# Patient Record
Sex: MALE | Race: WHITE | URBAN - METROPOLITAN AREA
[De-identification: names, ages, dates, MRNs, and addresses within clinical notes are randomized per-mention and may not be internally consistent; named-entity substitution may affect disease eponyms.]

---

## 2017-12-12 ENCOUNTER — APPOINTMENT (OUTPATIENT)
Dept: URBAN - METROPOLITAN AREA CLINIC 292 | Age: 22
Setting detail: DERMATOLOGY
End: 2017-12-12

## 2017-12-12 DIAGNOSIS — L70.0 ACNE VULGARIS: ICD-10-CM

## 2017-12-12 DIAGNOSIS — L81.0 POSTINFLAMMATORY HYPERPIGMENTATION: ICD-10-CM

## 2017-12-12 PROCEDURE — OTHER OTHER: OTHER

## 2017-12-12 PROCEDURE — OTHER PRESCRIPTION: OTHER

## 2017-12-12 PROCEDURE — 99202 OFFICE O/P NEW SF 15 MIN: CPT | Mod: 25

## 2017-12-12 PROCEDURE — OTHER ACNE SURGERY (MULTIPLE LESIONS): OTHER

## 2017-12-12 PROCEDURE — OTHER CHEMICAL PEEL GLYCOLIC ACID: OTHER

## 2017-12-12 PROCEDURE — 10040 ACNE SURGERY: CPT

## 2017-12-12 PROCEDURE — OTHER COUNSELING: OTHER

## 2017-12-12 RX ORDER — BENZOYL PEROXIDE 5 G/100G
GEL TOPICAL
Qty: 1 | Refills: 5 | Status: ERX | COMMUNITY
Start: 2017-12-12

## 2017-12-12 RX ORDER — MINOCYCLINE HYDROCHLORIDE 100 MG/1
CAPSULE ORAL
Qty: 60 | Refills: 5 | Status: ERX | COMMUNITY
Start: 2017-12-12

## 2017-12-12 ASSESSMENT — LOCATION DETAILED DESCRIPTION DERM
LOCATION DETAILED: LEFT SUPERIOR MEDIAL FOREHEAD
LOCATION DETAILED: LEFT CENTRAL MALAR CHEEK
LOCATION DETAILED: LEFT MEDIAL FOREHEAD

## 2017-12-12 ASSESSMENT — LOCATION SIMPLE DESCRIPTION DERM
LOCATION SIMPLE: LEFT FOREHEAD
LOCATION SIMPLE: LEFT CHEEK

## 2017-12-12 ASSESSMENT — LOCATION ZONE DERM: LOCATION ZONE: FACE

## 2017-12-12 NOTE — PROCEDURE: OTHER
Other (Free Text): Differin Qhs
Note Text (......Xxx Chief Complaint.): This diagnosis correlates with the
Detail Level: Zone

## 2017-12-12 NOTE — PROCEDURE: CHEMICAL PEEL GLYCOLIC ACID
Glycolic Acid %: 30%
Consent: Prior to the procedure, written consent was obtained and risks were reviewed, including but not limited to: redness, peeling, blistering, pigmentary change, scarring, infection, and pain.
Time (Mins): 1
Prep: The treated area was degreased with pre-peel cleanser, and vaseline was applied for protection of mucous membranes.
Post Peel Care: The peel was neutralized with sodium bicarbonate.  After the procedure, the treatment area was washed with soap and water, and a post-peel cream was applied. Sun protection and post-care instructions were reviewed with the patient.
Post-Care Instructions: I reviewed with the patient in detail post-care instructions. Patient should avoid sun exposure and wear sun protection.
Treatment Number: 0
Detail Level: Zone

## 2017-12-12 NOTE — PROCEDURE: ACNE SURGERY (MULTIPLE LESIONS)
Acne Type: Comedonal Lesions
Consent was obtained and risks were reviewed including but not limited to scarring, infection, bleeding, scabbing, incomplete removal, and allergy to anesthesia.
Render Number Of Lesions Treated: no
Total Number Of Lesions Treated: 5
Detail Level: Detailed
Post-Care Instructions: I reviewed with the patient in detail post-care instructions. Patient is to keep the treatment areas dry overnight, and then apply bacitracin twice daily until healed. Patient may apply hydrogen peroxide soaks to remove any crusting.
Extraction Method: 11 blade and comedone extractor
Prep Text (Optional): Prior to removal the treatment areas were prepped in the usual fashion.

## 2017-12-22 ENCOUNTER — RX ONLY (RX ONLY)
Age: 22
End: 2017-12-22

## 2017-12-22 RX ORDER — BENZOYL PEROXIDE 10 G/100G
SUSPENSION TOPICAL
Qty: 1 | Refills: 3 | Status: ERX | COMMUNITY
Start: 2017-12-22

## 2018-01-02 ENCOUNTER — RX ONLY (RX ONLY)
Age: 23
End: 2018-01-02

## 2018-01-02 RX ORDER — BENZOYL PEROXIDE 5 G/100G
GEL TOPICAL
Qty: 1 | Refills: 5 | Status: ERX

## 2018-01-02 RX ORDER — BENZOYL PEROXIDE 10 G/100G
SUSPENSION TOPICAL
Qty: 1 | Refills: 3 | Status: ERX

## 2018-01-02 RX ORDER — MINOCYCLINE HYDROCHLORIDE 100 MG/1
CAPSULE ORAL
Qty: 60 | Refills: 5 | Status: ERX

## 2018-02-20 ENCOUNTER — APPOINTMENT (OUTPATIENT)
Dept: URBAN - METROPOLITAN AREA CLINIC 292 | Age: 23
Setting detail: DERMATOLOGY
End: 2018-02-20

## 2018-02-20 DIAGNOSIS — L81.0 POSTINFLAMMATORY HYPERPIGMENTATION: ICD-10-CM

## 2018-02-20 DIAGNOSIS — L70.0 ACNE VULGARIS: ICD-10-CM

## 2018-02-20 PROCEDURE — OTHER OTHER: OTHER

## 2018-02-20 PROCEDURE — 99213 OFFICE O/P EST LOW 20 MIN: CPT | Mod: 25

## 2018-02-20 PROCEDURE — 10040 ACNE SURGERY: CPT

## 2018-02-20 PROCEDURE — OTHER ACNE SURGERY (MULTIPLE LESIONS): OTHER

## 2018-02-20 PROCEDURE — OTHER CHEMICAL PEEL GLYCOLIC ACID: OTHER

## 2018-02-20 PROCEDURE — OTHER COUNSELING: OTHER

## 2018-02-20 PROCEDURE — OTHER PRESCRIPTION: OTHER

## 2018-02-20 RX ORDER — DOXYCYCLINE 100 MG/1
CAPSULE ORAL
Qty: 60 | Refills: 3 | Status: ERX | COMMUNITY
Start: 2018-02-20

## 2018-02-20 RX ORDER — CLINDAMYCIN PHOSPHATE 10 MG/ML
SOLUTION TOPICAL
Qty: 1 | Refills: 5 | Status: ERX | COMMUNITY
Start: 2018-02-20

## 2018-02-20 ASSESSMENT — LOCATION ZONE DERM: LOCATION ZONE: FACE

## 2018-02-20 ASSESSMENT — LOCATION DETAILED DESCRIPTION DERM
LOCATION DETAILED: LEFT CENTRAL MALAR CHEEK
LOCATION DETAILED: LEFT SUPERIOR MEDIAL FOREHEAD
LOCATION DETAILED: LEFT MEDIAL FOREHEAD

## 2018-02-20 ASSESSMENT — LOCATION SIMPLE DESCRIPTION DERM
LOCATION SIMPLE: LEFT FOREHEAD
LOCATION SIMPLE: LEFT CHEEK

## 2018-02-20 NOTE — PROCEDURE: CHEMICAL PEEL GLYCOLIC ACID
Detail Level: Zone
Treatment Number: 0
Glycolic Acid %: 30%
Consent: Prior to the procedure, written consent was obtained and risks were reviewed, including but not limited to: redness, peeling, blistering, pigmentary change, scarring, infection, and pain.
Time (Mins): 1
Post Peel Care: The peel was neutralized with sodium bicarbonate.  After the procedure, the treatment area was washed with soap and water, and a post-peel cream was applied. Sun protection and post-care instructions were reviewed with the patient.
Post-Care Instructions: I reviewed with the patient in detail post-care instructions. Patient should avoid sun exposure and wear sun protection.
Prep: The treated area was degreased with pre-peel cleanser, and vaseline was applied for protection of mucous membranes.

## 2018-02-20 NOTE — PROCEDURE: ACNE SURGERY (MULTIPLE LESIONS)
Detail Level: Detailed
Consent was obtained and risks were reviewed including but not limited to scarring, infection, bleeding, scabbing, incomplete removal, and allergy to anesthesia.
Total Number Of Lesions Treated: 5
Render Post-Care Instructions In Note?: no
Extraction Method: 11 blade and comedone extractor
Acne Type: Comedonal Lesions
Post-Care Instructions: I reviewed with the patient in detail post-care instructions. Patient is to keep the treatment areas dry overnight, and then apply bacitracin twice daily until healed. Patient may apply hydrogen peroxide soaks to remove any crusting.
Prep Text (Optional): Prior to removal the treatment areas were prepped in the usual fashion.

## 2018-02-20 NOTE — PROCEDURE: OTHER
Note Text (......Xxx Chief Complaint.): This diagnosis correlates with the
Detail Level: Zone
Other (Free Text): Differin Qhs
